# Patient Record
Sex: FEMALE | Race: AMERICAN INDIAN OR ALASKA NATIVE
[De-identification: names, ages, dates, MRNs, and addresses within clinical notes are randomized per-mention and may not be internally consistent; named-entity substitution may affect disease eponyms.]

---

## 2019-10-15 ENCOUNTER — HOSPITAL ENCOUNTER (EMERGENCY)
Dept: HOSPITAL 5 - ED | Age: 54
Discharge: HOME | End: 2019-10-15
Payer: COMMERCIAL

## 2019-10-15 VITALS — SYSTOLIC BLOOD PRESSURE: 115 MMHG | DIASTOLIC BLOOD PRESSURE: 84 MMHG

## 2019-10-15 DIAGNOSIS — Z79.899: ICD-10-CM

## 2019-10-15 DIAGNOSIS — Z90.711: ICD-10-CM

## 2019-10-15 DIAGNOSIS — J40: ICD-10-CM

## 2019-10-15 DIAGNOSIS — J06.9: Primary | ICD-10-CM

## 2019-10-15 PROCEDURE — 71046 X-RAY EXAM CHEST 2 VIEWS: CPT

## 2019-10-15 PROCEDURE — 94644 CONT INHLJ TX 1ST HOUR: CPT

## 2019-10-15 PROCEDURE — 99283 EMERGENCY DEPT VISIT LOW MDM: CPT

## 2019-10-15 PROCEDURE — 94640 AIRWAY INHALATION TREATMENT: CPT

## 2019-10-15 NOTE — XRAY REPORT
CHEST 2 VIEWS 



INDICATION:  Shortness of breath, cough, congestion for 2 weeks.



COMPARISON:  None



FINDINGS:

Support devices: None.



Heart: Within normal limits. 

Lungs/pleura: No acute air space or interstitial disease.  No pneumothorax.



Additional findings: None.



IMPRESSION:

No acute findings.



Signer Name: Daniel Kiran Jr, MD 

Signed: 10/15/2019 12:40 PM

 Workstation Name: [id number]

## 2019-10-15 NOTE — EMERGENCY DEPARTMENT REPORT
<ZACARIAS PALMA - Last Filed: 10/15/19 12:15>





ED General Adult HPI





- General


Chief complaint: Upper Respiratory Infection


Stated complaint: CHEST COLD/FLU LIKE SYM


Source: patient


Mode of arrival: Ambulatory


Limitations: No Limitations





- History of Present Illness


Initial comments: 


55yo  female states that she has severe congestion and body 

aches that has been ongoing for 2 weeks. She states that she has tried OTC 

medications with no relief.








- Related Data


                                  Previous Rx's











 Medication  Instructions  Recorded  Last Taken  Type


 


Azithromycin [Zithromax Z-REVA] 250 mg PO QDAY #6 tablet 02/29/16 Unknown Rx


 


ALBUTEROL Inhaler (OR & NICU) 2 puff IH QID PRN #1 inhalation 10/15/19 Unknown 

Rx





[ProAir HFA Inhaler]    


 


guaiFENesin/CODEINE [Robitussin AC] 5 ml PO Q6H PRN #100 ml 10/15/19 Unknown Rx











                                    Allergies











Allergy/AdvReac Type Severity Reaction Status Date / Time


 


No Known Allergies Allergy   Verified 10/15/19 12:10














ED Past Medical Hx





- Past Medical History


Previous Medical History?: No





- Surgical History


Past Surgical History?: Yes


Additional Surgical History: partial hysterectomy, 1 c section





- Social History


Smoking Status: Never Smoker


Substance Use Type: Alcohol





- Medications


Home Medications: 


                                Home Medications











 Medication  Instructions  Recorded  Confirmed  Last Taken  Type


 


Azithromycin [Zithromax Z-REVA] 250 mg PO QDAY #6 tablet 02/29/16  Unknown Rx


 


ALBUTEROL Inhaler (OR & NICU) 2 puff IH QID PRN #1 inhalation 10/15/19  Unknown 

Rx





[ProAir HFA Inhaler]     


 


guaiFENesin/CODEINE [Robitussin AC] 5 ml PO Q6H PRN #100 ml 10/15/19  Unknown Rx














ED Physical Exam





- General


Limitations: No Limitations





ED Disposition


Clinical Impression: 


 Upper respiratory infection, Bronchitis





Disposition: DC-01 TO HOME OR SELFCARE


Condition: Stable


Instructions:  Acute Bronchitis (ED), Upper Respiratory Infection (ED)


Additional Instructions: 


Please follow-up with a primary care physician in the next few days.  Return to 

the emergency Department with any worsening of your symptoms or any acute distre

ss.





You have been prescribed a medication that is sedating and therefore should not 

be taken prior to driving, working, and responsible for children and in no way 

should be mixed with alcohol of any quantity.


Prescriptions: 


ALBUTEROL Inhaler (OR & NICU) [ProAir HFA Inhaler] 2 puff IH QID PRN #1 

inhalation


 PRN Reason: Shortness Of Breath


guaiFENesin/CODEINE [Robitussin AC] 5 ml PO Q6H PRN #100 ml


 PRN Reason: Cough


Referrals: 


[facility] [Outside] - 3-5 Days


[facility] [Outside] - 3-5 Days


[facility] [Outside] - 3-5 Days





<ALEJANDRO NIÑO - Last Filed: 10/15/19 14:48>





ED General Adult HPI





- History of Present Illness


Initial comments: 


54-year-old  female presents to the emergency department with a 

two-week history of productive cough, chest congestion, shortness of breath and 

fatigue.  She says that the last time she had symptoms like this was when she 

had walking pneumonia and thinks maybe she has it again.  She has not a tobacco 

smoker.  She does not have a primary care physician.  She says that she has 

taken multiple different over-the-counter medications without any relief.  No 

recent travel or sick contacts at home.





ED Review of Systems


ROS: 


Stated complaint: CHEST COLD/FLU LIKE SYM


Other details as noted in HPI





Comment: All other systems reviewed and negative


Constitutional: denies: chills, fever


Eyes: denies: eye pain, vision change


ENT: congestion.  denies: ear pain, throat pain


Respiratory: cough, shortness of breath


Cardiovascular: denies: chest pain, palpitations


Neurological: denies: headache, weakness





ED Course


                                   Vital Signs











  10/15/19 10/15/19





  12:14 14:05


 


Temperature 98.6 F 


 


Pulse Rate 64 


 


Pulse Rate [  66





Anterior  





Bilateral  





Throughout]  


 


Pulse Rate [  64





Posterior  





Bilateral  





Throughout]  


 


Respiratory 20 





Rate  


 


Respiratory  19





Rate [Anterior  





Bilateral  





Throughout]  


 


Respiratory  18





Rate [Posterior  





Bilateral  





Throughout]  


 


Blood Pressure 112/82 





[Right]  


 


O2 Sat by Pulse 100 





Oximetry  














ED Medical Decision Making





- Radiology Data


Radiology results: image reviewed


interpreted by me: 





Chest x-ray does not show any acute process.  There are no pleural effusions, 

obvious pneumonia and there is no pneumothorax.


Critical care attestation.: 


If time is entered above; I have spent that time in minutes in the direct care 

of this critically ill patient, excluding procedure time.








ED Disposition


Is pt being admited?: No


Time of Disposition: 14:48